# Patient Record
Sex: FEMALE | Race: ASIAN | NOT HISPANIC OR LATINO | Employment: UNEMPLOYED | URBAN - METROPOLITAN AREA
[De-identification: names, ages, dates, MRNs, and addresses within clinical notes are randomized per-mention and may not be internally consistent; named-entity substitution may affect disease eponyms.]

---

## 2024-01-16 ENCOUNTER — OFFICE VISIT (OUTPATIENT)
Dept: FAMILY MEDICINE | Facility: CLINIC | Age: 44
End: 2024-01-16
Payer: COMMERCIAL

## 2024-01-16 VITALS
SYSTOLIC BLOOD PRESSURE: 148 MMHG | DIASTOLIC BLOOD PRESSURE: 101 MMHG | HEART RATE: 111 BPM | WEIGHT: 148.4 LBS | OXYGEN SATURATION: 100 % | HEIGHT: 61 IN | TEMPERATURE: 97.7 F | BODY MASS INDEX: 28.02 KG/M2

## 2024-01-16 DIAGNOSIS — Z00.00 ROUTINE GENERAL MEDICAL EXAMINATION AT A HEALTH CARE FACILITY: Primary | ICD-10-CM

## 2024-01-16 DIAGNOSIS — F32.1 MAJOR DEPRESSIVE DISORDER, SINGLE EPISODE, MODERATE (H): ICD-10-CM

## 2024-01-16 DIAGNOSIS — N95.9 PREMENOPAUSAL PATIENT: ICD-10-CM

## 2024-01-16 DIAGNOSIS — Z11.59 NEED FOR HEPATITIS C SCREENING TEST: ICD-10-CM

## 2024-01-16 DIAGNOSIS — E04.9 THYROID GOITER: ICD-10-CM

## 2024-01-16 DIAGNOSIS — Z11.4 SCREENING FOR HIV (HUMAN IMMUNODEFICIENCY VIRUS): ICD-10-CM

## 2024-01-16 DIAGNOSIS — K59.00 CONSTIPATION, UNSPECIFIED CONSTIPATION TYPE: ICD-10-CM

## 2024-01-16 DIAGNOSIS — Z12.4 CERVICAL CANCER SCREENING: ICD-10-CM

## 2024-01-16 DIAGNOSIS — R03.0 ELEVATED BLOOD PRESSURE READING WITHOUT DIAGNOSIS OF HYPERTENSION: ICD-10-CM

## 2024-01-16 PROBLEM — M25.531 BILATERAL WRIST PAIN: Status: ACTIVE | Noted: 2021-08-15

## 2024-01-16 PROBLEM — M25.532 BILATERAL WRIST PAIN: Status: RESOLVED | Noted: 2021-08-15 | Resolved: 2024-01-16

## 2024-01-16 PROBLEM — E28.2 PCOS (POLYCYSTIC OVARIAN SYNDROME): Status: ACTIVE | Noted: 2018-03-27

## 2024-01-16 PROBLEM — R79.89 LOW VITAMIN B12 LEVEL: Status: ACTIVE | Noted: 2021-10-26

## 2024-01-16 PROBLEM — E78.5 HYPERLIPIDEMIA: Status: ACTIVE | Noted: 2017-01-01

## 2024-01-16 PROBLEM — M25.531 BILATERAL WRIST PAIN: Status: RESOLVED | Noted: 2021-08-15 | Resolved: 2024-01-16

## 2024-01-16 PROBLEM — D50.9 IRON DEFICIENCY ANEMIA: Status: ACTIVE | Noted: 2020-11-27

## 2024-01-16 PROBLEM — M25.532 BILATERAL WRIST PAIN: Status: ACTIVE | Noted: 2021-08-15

## 2024-01-16 LAB
ANION GAP SERPL CALCULATED.3IONS-SCNC: 8 MMOL/L (ref 7–15)
BUN SERPL-MCNC: 9 MG/DL (ref 6–20)
CALCIUM SERPL-MCNC: 9.2 MG/DL (ref 8.6–10)
CHLORIDE SERPL-SCNC: 105 MMOL/L (ref 98–107)
CHOLEST SERPL-MCNC: 293 MG/DL
CREAT SERPL-MCNC: 0.72 MG/DL (ref 0.51–0.95)
DEPRECATED HCO3 PLAS-SCNC: 24 MMOL/L (ref 22–29)
EGFRCR SERPLBLD CKD-EPI 2021: >90 ML/MIN/1.73M2
FASTING STATUS PATIENT QL REPORTED: NO
GLUCOSE SERPL-MCNC: 95 MG/DL (ref 70–99)
HCV AB SERPL QL IA: NONREACTIVE
HDLC SERPL-MCNC: 43 MG/DL
HIV 1+2 AB+HIV1 P24 AG SERPL QL IA: NONREACTIVE
LDLC SERPL CALC-MCNC: 181 MG/DL
NONHDLC SERPL-MCNC: 250 MG/DL
POTASSIUM SERPL-SCNC: 4 MMOL/L (ref 3.4–5.3)
SODIUM SERPL-SCNC: 137 MMOL/L (ref 135–145)
TRIGL SERPL-MCNC: 344 MG/DL
TSH SERPL DL<=0.005 MIU/L-ACNC: 1.61 UIU/ML (ref 0.3–4.2)

## 2024-01-16 PROCEDURE — 87624 HPV HI-RISK TYP POOLED RSLT: CPT

## 2024-01-16 PROCEDURE — 86803 HEPATITIS C AB TEST: CPT

## 2024-01-16 PROCEDURE — 99386 PREV VISIT NEW AGE 40-64: CPT | Mod: 25

## 2024-01-16 PROCEDURE — 36415 COLL VENOUS BLD VENIPUNCTURE: CPT

## 2024-01-16 PROCEDURE — 90480 ADMN SARSCOV2 VAC 1/ONLY CMP: CPT

## 2024-01-16 PROCEDURE — G0145 SCR C/V CYTO,THINLAYER,RESCR: HCPCS

## 2024-01-16 PROCEDURE — 80061 LIPID PANEL: CPT

## 2024-01-16 PROCEDURE — 91320 SARSCV2 VAC 30MCG TRS-SUC IM: CPT

## 2024-01-16 PROCEDURE — 87389 HIV-1 AG W/HIV-1&-2 AB AG IA: CPT

## 2024-01-16 PROCEDURE — 80048 BASIC METABOLIC PNL TOTAL CA: CPT

## 2024-01-16 PROCEDURE — 90471 IMMUNIZATION ADMIN: CPT

## 2024-01-16 PROCEDURE — 90686 IIV4 VACC NO PRSV 0.5 ML IM: CPT

## 2024-01-16 PROCEDURE — 99213 OFFICE O/P EST LOW 20 MIN: CPT | Mod: 25

## 2024-01-16 PROCEDURE — 84443 ASSAY THYROID STIM HORMONE: CPT

## 2024-01-16 RX ORDER — CETIRIZINE HYDROCHLORIDE 10 MG/1
10 TABLET ORAL
COMMUNITY

## 2024-01-16 RX ORDER — ACETAMINOPHEN AND CODEINE PHOSPHATE 120; 12 MG/5ML; MG/5ML
0.35 SOLUTION ORAL DAILY
Qty: 90 TABLET | Refills: 3 | Status: SHIPPED | OUTPATIENT
Start: 2024-01-16

## 2024-01-16 RX ORDER — ACETAMINOPHEN AND CODEINE PHOSPHATE 120; 12 MG/5ML; MG/5ML
0.35 SOLUTION ORAL
COMMUNITY
Start: 2023-03-12 | End: 2024-01-16

## 2024-01-16 RX ORDER — BUPROPION HYDROCHLORIDE 150 MG/1
TABLET ORAL
COMMUNITY
Start: 2022-06-02

## 2024-01-16 RX ORDER — POLYETHYLENE GLYCOL 3350 17 G/17G
17 POWDER, FOR SOLUTION ORAL
COMMUNITY

## 2024-01-16 RX ORDER — BUSPIRONE HYDROCHLORIDE 15 MG/1
15 TABLET ORAL
COMMUNITY
Start: 2022-08-20

## 2024-01-16 RX ORDER — PIMECROLIMUS 10 MG/G
CREAM TOPICAL
COMMUNITY

## 2024-01-16 RX ORDER — BUPROPION HYDROCHLORIDE 300 MG/1
TABLET ORAL
COMMUNITY
Start: 2022-05-08

## 2024-01-16 RX ORDER — KETOCONAZOLE 20 MG/ML
SHAMPOO TOPICAL
COMMUNITY
Start: 2023-06-09

## 2024-01-16 ASSESSMENT — ENCOUNTER SYMPTOMS
DIARRHEA: 0
JOINT SWELLING: 0
SHORTNESS OF BREATH: 0
HEARTBURN: 0
BREAST MASS: 0
WEAKNESS: 0
NAUSEA: 1
NERVOUS/ANXIOUS: 0
FREQUENCY: 0
DIZZINESS: 1
SORE THROAT: 0
EYE PAIN: 0
HEMATURIA: 0
MYALGIAS: 0
COUGH: 0
HEADACHES: 1
PARESTHESIAS: 0
ARTHRALGIAS: 0
CHILLS: 0
ABDOMINAL PAIN: 0
HEMATOCHEZIA: 0
DYSURIA: 0
FEVER: 0
PALPITATIONS: 0
CONSTIPATION: 1

## 2024-01-16 NOTE — PROGRESS NOTES
SUBJECTIVE:   Penny is a 43 year old, presenting for the following:  Physical and menopause (Symptoms of Menopause- feeling hot, nausea, bp/ discuss blood test)        2024     8:12 AM   Additional Questions   Roomed by maykel silverio       Healthy Habits:     Getting at least 3 servings of Calcium per day:  NO    Bi-annual eye exam:  Yes    Dental care twice a year:  Yes    Sleep apnea or symptoms of sleep apnea:  Daytime drowsiness    Diet:  Regular (no restrictions)    Frequency of exercise:  None    Taking medications regularly:  Yes    Medication side effects:  Other    Additional concerns today:  Yes    Multiple concerns today. Patient will make another appointment at a later time to discuss other concerns.     Today's PHQ-2 Score:       2024     7:56 AM   PHQ-2 (  [employer])   Q1: Little interest or pleasure in doing things 0   Q2: Feeling down, depressed or hopeless 0   PHQ-2 Score 0   Q1: Little interest or pleasure in doing things Not at all   Q2: Feeling down, depressed or hopeless Not at all   PHQ-2 Score 0     Sees a psychiatrist who prescribes medication and sees a therapist regularly.       Have you ever done Advance Care Planning? (For example, a Health Directive, POLST, or a discussion with a medical provider or your loved ones about your wishes): No, advance care planning information given to patient to review.  Patient plans to discuss their wishes with loved ones or provider.      Social History     Tobacco Use    Smoking status: Never    Smokeless tobacco: Never   Substance Use Topics    Alcohol use: Not on file           2024     7:56 AM   Alcohol Use   Prescreen: >3 drinks/day or >7 drinks/week? No     Reviewed orders with patient.  Reviewed health maintenance and updated orders accordingly - Yes  Lab work is in process    Breast Cancer Screenin/16/2024     7:57 AM   Breast CA Risk Assessment (FHS-7)   Do you have a family history of breast, colon, or ovarian cancer? No  / Unknown       Mammogram Screening - Offered annual screening and updated Health Maintenance for mutual plan based on risk factor consideration  Pertinent mammograms are reviewed under the imaging tab.    History of abnormal Pap smear: NO - age 30-65 PAP every 5 years with negative HPV co-testing recommended     Reviewed and updated as needed this visit by clinical staff   Tobacco  Allergies  Meds  Problems  Med Hx  Surg Hx  Fam Hx          Reviewed and updated as needed this visit by Provider   Tobacco  Allergies  Meds  Problems  Med Hx  Surg Hx  Fam Hx             Review of Systems   Constitutional:  Negative for chills and fever.   HENT:  Negative for congestion, ear pain, hearing loss and sore throat.    Eyes:  Negative for pain and visual disturbance.   Respiratory:  Negative for cough and shortness of breath.    Cardiovascular:  Negative for chest pain, palpitations and peripheral edema.   Gastrointestinal:  Positive for constipation and nausea. Negative for abdominal pain, diarrhea, heartburn and hematochezia.   Breasts:  Negative for tenderness, breast mass and discharge.   Genitourinary:  Positive for vaginal discharge. Negative for dysuria, frequency, genital sores, hematuria, pelvic pain, urgency and vaginal bleeding.   Musculoskeletal:  Negative for arthralgias, joint swelling and myalgias.   Skin:  Positive for rash.   Neurological:  Positive for dizziness and headaches. Negative for weakness and paresthesias.   Psychiatric/Behavioral:  Negative for mood changes. The patient is not nervous/anxious.      Has a history of constipation and hemorroids.     Experiencing menopausal symptoms. Currently on progesterone as birth control option so unable to determine last period. Has experienced mood swings and hot flashes. Unsure when her mother went through menopause but does not believe it was in her 40s.      OBJECTIVE:   BP (!) 148/101 (BP Location: Right arm, Patient Position: Sitting, Cuff  "Size: Adult Regular)   Pulse 111   Temp 97.7  F (36.5  C) (Oral)   Ht 1.549 m (5' 1\")   Wt 67.3 kg (148 lb 6.4 oz)   LMP  (LMP Unknown)   SpO2 100%   BMI 28.04 kg/m    Physical Exam  GENERAL: healthy, alert and no distress  EYES: Eyes grossly normal to inspection, PERRL and conjunctivae and sclerae normal  HENT: ear canals and TM's normal, nose and mouth without ulcers or lesions  NECK: no adenopathy, no asymmetry, masses, or scars and thyroid normal to palpation  RESP: lungs clear to auscultation - no rales, rhonchi or wheezes  BREAST: normal without masses, tenderness or nipple discharge and no palpable axillary masses or adenopathy  CV: regular rate and rhythm, normal S1 S2, no S3 or S4, no murmur, click or rub, no peripheral edema and peripheral pulses strong  ABDOMEN: soft, nontender, no hepatosplenomegaly, no masses and bowel sounds normal   (female): normal female external genitalia, normal urethral meatus, vaginal mucosa pink, moist and normal cervix without masses or discharge. PAP collected  MS: no gross musculoskeletal defects noted, no edema  SKIN: no suspicious lesions or rashes  NEURO: Normal strength and tone, mentation intact and speech normal  PSYCH: mentation appears normal, affect normal/bright        ASSESSMENT/PLAN:   (Z00.00) Routine general medical examination at a health care facility  (primary encounter diagnosis)  Comment: Care gaps addressed. Flu and COVID vaccines given. Routine labs ordered. Has not completed mammogram, order placed.   Plan: REVIEW OF HEALTH MAINTENANCE PROTOCOL ORDERS,         HIV Antigen Antibody Combo, Hepatitis C Screen         Reflex to HCV RNA Quant and Genotype, Pap         Screen with HPV - recommended age 30 - 65         years, INFLUENZA VACCINE IM > 6 MONTHS VALENT         IIV4 (AFLURIA/FLUZONE), COVID-19 12+ (2023-24)         ([employer]), Ob/Gyn  Referral,         norethindrone (MICRONOR) 0.35 MG tablet, Lipid         panel reflex to direct LDL " Fasting, TSH with         free T4 reflex, Basic metabolic panel  (Ca, Cl,        CO2, Creat, Gluc, K, Na, BUN), *MA Screening         Digital Bilateral        Follow up in one year for yearly prevent    (Z11.4) Screening for HIV (human immunodeficiency virus)  Comment: Lab ordered  Plan: HIV Antigen Antibody Combo    (Z11.59) Need for hepatitis C screening test  Comment: Lab ordered  Plan: Hepatitis C Screen Reflex to HCV RNA Quant and         Genotype    (R03.0) Elevated blood pressure reading without diagnosis of hypertension  Comment: Very anxious today. Encouraged patient to take blood pressures at home daily and record results. Plan for patient to follow up in 2-4 weeks to discuss blood pressure results.     (N95.9) Premenopausal patient  Comment: Currently on progesterone as birth control so difficult to determine menopausal state. Although young, patient is experiencing hot flashes and mood swings so she may be going through menopause. This is very bothersome to patient and is interested in various treatment options. Ob/Gyn referral placed.   Plan: Ob/Gyn  Referral    (Z12.4) Cervical cancer screening  Comment: PAP collected.  Plan: Pap Screen with HPV - recommended age 30 - 65         years    (F32.1) Major depressive disorder, single episode, moderate (H)  Comment: Stable, sees a psychiatrist for med management.     (K59.00) Constipation, unspecified constipation type  Comment: Has experienced constipation for a number of years along with hemorrhoids. States her diet is poor which she believes is contributing to constipation.   Plan: Take miralax daily and titrate based off of stools. Also increase fiber intake and/or implement fiber supplements into diet. Use perpetrationH for symptom management of hemorrhoids     (E04.9) Thyroid goiter  Comment: History of thyroid goiter. States she had treatment on it as a child and has her thyroid labs checked every couple of years for monitoring. TSH ordered.  "  Plan: Will follow up based on result.           COUNSELING:  Reviewed preventive health counseling, as reflected in patient instructions      BMI:   Estimated body mass index is 28.04 kg/m  as calculated from the following:    Height as of this encounter: 1.549 m (5' 1\").    Weight as of this encounter: 67.3 kg (148 lb 6.4 oz).         She reports that she has never smoked. She has never used smokeless tobacco.    LI Arevalo CNP  [facility]  "

## 2024-01-16 NOTE — PATIENT INSTRUCTIONS
Start Miralax daily and titrate based off of stools. Try to increase your fiber and add fiber supplements.  Use preparationH to help with pain  Make an appointment with OB/Gyn for post menopause    Preventive Health Recommendations  Female Ages 40 to 49    Yearly exam:   See your health care provider every year in order to  Review health changes.   Discuss preventive care.    Review your medicines if your doctor prescribed any.    Get a Pap test every three years (unless you have an abnormal result and your provider advises testing more often).    If you get Pap tests with HPV test, you only need to test every 5 years, unless you have an abnormal result. You do not need a Pap test if your uterus was removed (hysterectomy) and you have not had cancer.    You should be tested each year for STDs (sexually transmitted diseases), if you're at risk.   Ask your doctor if you should have a mammogram.    Have a colonoscopy (test for colon cancer) beginning at age 45.  Ask your provider about other options like a yearly FIT test or Cologuard test every 3 years (stool tests)      Have a cholesterol test every 5 years.     Have a diabetes test (fasting glucose) after age 45. If you are at risk for diabetes, you should have this test every 3 years.    Shots: Get a flu shot each year. Get a tetanus shot every 10 years.     Nutrition:   Eat at least 5 servings of fruits and vegetables each day.  Eat whole-grain bread, whole-wheat pasta and brown rice instead of white grains and rice.  Get adequate Calcium and Vitamin D.      Lifestyle  Exercise at least 150 minutes a week (an average of 30 minutes a day, 5 days a week). This will help you control your weight and prevent disease.  Limit alcohol to one drink per day.  No smoking.   Wear sunscreen to prevent skin cancer.  See your dentist every six months for an exam and cleaning.

## 2024-01-16 NOTE — COMMUNITY RESOURCES LIST (ENGLISH)
01/16/2024   [facility]  N/A  For questions about this resource list or additional care needs, please contact your primary care clinic or care manager.  Phone: [phone]   Email: N/A   Address: [facility] [place] [address]   Hours: N/A        Hotlines and Helplines       Hotline - Housing crisis  1  [facility] Housing Resource Line Distance: 7.47 miles      Phone/Virtual   [address] [place] [address]  Language: English  Hours: Mon - Sun Open 24 Hours   Phone: (695) 991-9293 Website: https://www.[facility]./2689/Basic-Needs     2  [facility] Distance: 12.82 miles      Phone/Virtual   [address] [place] [address]  Language: English  Hours: Mon - Sun Open 24 Hours   Phone: (875) 474-4437 Email: communications@[facility].org Website: https://[facility].org/oursaviourshousing/          Housing       Coordinated Entry access point  3  [facility]  Office - [facility] Distance: 1.08 miles      Phone/Virtual   [address] [place] [address]  Language: English  Hours: Mon - Fri 8:30 AM - 12:00 PM , Mon - Fri 1:00 PM - 4:00 PM  Fees: Free   Phone: (871) 570-5288 Ext.2 Email: tammie@[facility] [place].[facility].org Website: https://www.[facility] Networks[facility].org/usn/     4  [facility] ([facility] - Housing Services Distance: 12.94 miles      In-Person   [address] [place] [address]  Language: English  Hours: Mon - Fri 9:00 AM - 5:00 PM  Fees: Free   Phone: (217) 994-4034 Email: housing@[facility].org Website: http://www.[facility].org/housing     Drop-in center or day shelter  5  [facility] Distance: 11.32 miles      In-Person   [address] [place] [address]  Language: English, Hmong, Afghan, Cymro  Hours: Mon - Thu 8:30 AM - 4:30 PM , Sat - Sun 9:00 AM - 12:00 PM  Fees: Free   Phone: (712) 851-6858 Email: info@[employer].org Website: https://[employer].org/     6   [facility] Distance: 12.39 miles      In-Person   [address] [place] [address]  Language: English, Macanese, Welsh  Hours: Mon - Sat 7:00 AM - 3:00 PM  Fees: Free, Self Pay   Phone: (484) 193-8439 Email: info@[employer] Website: https://www.[employer]/locations/opportunity-center/     Housing search assistance  7  [facility] ([facility]) - [facility] Rental Housing Distance: 1.09 miles      In-Person, Phone/Virtual   [address] [place] [address]  Language: English  Hours: Mon - Fri 8:00 AM - 4:30 PM  Fees: Free   Phone: (856) 729-3326 Email: accap@[employer].org Website: http://www.[employer]ap.org     8  [facility] [facility] [employer] ([employer] Distance: 5.96 miles      Phone/Virtual   [address] [place] [address]  Language: English, Welsh  Hours: Mon - Fri 9:00 AM - 5:00 PM  Fees: Free   Phone: (676) 821-9356 Email: services@[employer] Website: https://www.[employer]     Shelter for families  9  [facility] Distance: 12.67 miles      In-Person   [address] [place] [address]  Language: English  Hours: Mon - Fri 3:00 PM - 9:00 AM , Sat - Sun Open 24 Hours  Fees: Free   Phone: (311) 287-1265 Ext.1 Website: https://www.[facility].org/2020/07/03/emergency-family-shelter/     Shelter for individuals  10  [facility] Distance: 11.65 miles      In-Person   [address] [place] [address]  Language: English  Hours: Mon - Fri 4:00 PM - 9:00 AM  Fees: Free   Phone: (463) 205-3100 Email: earl@[facility].org Website: https://[facility].org/[facility]/     11  [facility] Distance: 12.82 miles      In-Person   [address] [place] [address]  Language: English  Hours: Mon - Sun Open 24 Hours  Fees: Free   Phone: (344) 621-6764 Email:  communications@[facility].org Website: https://[facility].org/oursaviourshousing/          Important Numbers & Websites       Emergency Services   911  [facility]   311  Poison Control   (760) 435-9887  Suicide Prevention Lifeline   (555) 987-2453 (TALK)  Child Abuse Hotline   (120) 142-5776 (4-A-Child)  Sexual Assault Hotline   (123) 524-8947 (HOPE)  National Runaway Safeline   (478) 351-3587 (RUNAWAY)  All-Options Talkline   (931) 496-2360  Substance Abuse Referral   (570) 234-4891 (HELP)

## 2024-01-17 NOTE — RESULT ENCOUNTER NOTE
Kody Francis,     It was nice meeting you the other day!     Thyroid, Kidneys, electrolytes, and blood sugar are all normal.     Your cholesterol is elevated so I recommend working on diet and exercise changes to bring down these numbers. I added some information below. We will recheck this in one year.      Feel free to contact me via [employer] or call the clinic at [phone].     Sincerely,  Jennifer Huddleston DNP, FNP-C    The 10-year ASCVD risk score (Davin ROY, et al., 2019) is: 2.8%

## 2024-01-18 LAB
BKR LAB AP GYN ADEQUACY: NORMAL
BKR LAB AP GYN INTERPRETATION: NORMAL
BKR LAB AP HPV REFLEX: NORMAL
BKR LAB AP PREVIOUS ABNORMAL: NORMAL
PATH REPORT.COMMENTS IMP SPEC: NORMAL
PATH REPORT.COMMENTS IMP SPEC: NORMAL
PATH REPORT.RELEVANT HX SPEC: NORMAL

## 2024-01-22 LAB
HUMAN PAPILLOMA VIRUS 16 DNA: NEGATIVE
HUMAN PAPILLOMA VIRUS 18 DNA: NEGATIVE
HUMAN PAPILLOMA VIRUS FINAL DIAGNOSIS: NORMAL
HUMAN PAPILLOMA VIRUS OTHER HR: NEGATIVE

## 2024-01-25 ENCOUNTER — APPOINTMENT (OUTPATIENT)
Dept: URBAN - METROPOLITAN AREA CLINIC 252 | Age: 44
Setting detail: DERMATOLOGY
End: 2024-01-26

## 2024-01-25 VITALS — WEIGHT: 148 LBS | HEIGHT: 61 IN

## 2024-01-25 DIAGNOSIS — L20.89 OTHER ATOPIC DERMATITIS: ICD-10-CM

## 2024-01-25 DIAGNOSIS — L50.1 IDIOPATHIC URTICARIA: ICD-10-CM

## 2024-01-25 DIAGNOSIS — L21.8 OTHER SEBORRHEIC DERMATITIS: ICD-10-CM

## 2024-01-25 PROCEDURE — OTHER PRESCRIPTION: OTHER

## 2024-01-25 PROCEDURE — 99204 OFFICE O/P NEW MOD 45 MIN: CPT

## 2024-01-25 PROCEDURE — OTHER COUNSELING: OTHER

## 2024-01-25 RX ORDER — CLOBETASOL PROPIONATE 0.5 MG/G
0.05% CREAM TOPICAL BID
Qty: 60 | Refills: 2 | Status: ERX | COMMUNITY
Start: 2024-01-25

## 2024-01-25 RX ORDER — HYDROCORTISONE 25 MG/G
2.5% CREAM TOPICAL BID
Qty: 30 | Refills: 1 | Status: ERX | COMMUNITY
Start: 2024-01-25

## 2024-01-25 RX ORDER — TRIAMCINOLONE ACETONIDE 1 MG/G
0.1% CREAM TOPICAL BID
Qty: 80 | Refills: 1 | Status: ERX | COMMUNITY
Start: 2024-01-25

## 2024-01-25 ASSESSMENT — LOCATION SIMPLE DESCRIPTION DERM
LOCATION SIMPLE: TRAPEZIAL NECK
LOCATION SIMPLE: RIGHT CHEEK
LOCATION SIMPLE: RIGHT HAND
LOCATION SIMPLE: HAIR
LOCATION SIMPLE: POSTERIOR NECK
LOCATION SIMPLE: LEFT CHEEK
LOCATION SIMPLE: LEFT HAND
LOCATION SIMPLE: LEFT FOREHEAD

## 2024-01-25 ASSESSMENT — LOCATION DETAILED DESCRIPTION DERM
LOCATION DETAILED: RIGHT SUPERIOR CENTRAL MALAR CHEEK
LOCATION DETAILED: RIGHT MEDIAL TRAPEZIAL NECK
LOCATION DETAILED: HAIR
LOCATION DETAILED: LEFT CENTRAL MALAR CHEEK
LOCATION DETAILED: MID TRAPEZIAL NECK
LOCATION DETAILED: LEFT SUPERIOR CENTRAL MALAR CHEEK
LOCATION DETAILED: LEFT THENAR EMINENCE
LOCATION DETAILED: RIGHT THENAR EMINENCE
LOCATION DETAILED: LEFT INFERIOR MEDIAL FOREHEAD

## 2024-01-25 ASSESSMENT — LOCATION ZONE DERM
LOCATION ZONE: HAND
LOCATION ZONE: FACE
LOCATION ZONE: SCALP
LOCATION ZONE: NECK

## 2024-01-25 NOTE — HPI: RASH
Is This A New Presentation, Or A Follow-Up?: Rash
Additional History: She reports history of excema on hands. She reports random itchy episodes with no skin visual changes, tried ketoconazole shampoo for scalp and this helps flakiness if used a few days per week. She reports sweaty palms. She reports using fragranted moisturizers over whole body regularly. Now using laroch posay on body. Using okeef work hands on hands.  Was given steroid cream for “eye eczema” and this has been helpful.  She was recommended by allergist to have an allergen free diet and could not do longer than a week. Uses generic and woolight detergent and reports the woolight is not free and clear.  Does not use softener.  Uses dove sensitive skin. Takes cetirizine 10mg daily for years for environmental allergies. Started taking 3 times per day and currently taking bid generally and this helps her general itch.

## 2024-01-25 NOTE — PROCEDURE: COUNSELING
Patient Specific Counseling (Will Not Stick From Patient To Patient): -Recommend using Vanicream products (Moisturizer, shampoos, soap)
Detail Level: Zone
Patient Specific Counseling (Will Not Stick From Patient To Patient): -Recommend taking two tablets of OTC Zyrtec (cetirizine) at night, then once per night then use as needed.
Detail Level: Simple

## 2024-01-29 NOTE — PROGRESS NOTES
Assessment & Plan     Premenopausal patient  - Ob/Gyn  Referral    Symptomatic menopausal or female climacteric states   Patient and I had a very lengthy discussion related to her symptoms. Discussed use of hormonal contraceptive medication vs HRT. For HRT, discussed use for management of vasomotor and/or urogenital symptoms. I encourage her to continue to work with her psychiatrist for management of moods. We discussed the risks and benefits of hormone replacement therapy and the Women's Health Initiative. Including discussion of increased risk of stroke, heart attack, and coronary artery disease in patients with a personal history significant for chronic hypertension, hypercholesterolemia, or strong family history of coronary artery disease. We discussed current reccomendations for prescribing estrogen for symptomatic relief of hot flashes on a temporary basis. Discussed goal of being on hormonal replacement medication is for shortest duration needed and at lowest dose that can manage symptoms. We discussed a slightly increased risk of breast cancer.   Had PP HTN and one elevated reading at her preventive exam a few weeks ago. Normal but upper limit normal today. Her questions related to what she can to do help manage blood pressure discussed including diet changes, physical activity recommendations and other lifestyle modifications. Discussed how this can also help with her weight loss.  Patient still does need contraception and HRT will not provide this for her. Discussed use of Mirconor alone as she has been doing, but may not find much additional management of symptoms. Could consider progesterone IUD and transdermal estrogen HRT vs combined hormonal contraceptive with close monitoring of blood pressure. Will check labs below and plan follow up visit to discuss results/options in more detail. Patient is given an opportunity to ask questions and have them answered.  - Follicle stimulating hormone;  Future  - Estradiol; Future    42 minutes spent by me on the date of the encounter doing chart review, history and exam, documentation and further activities per the note       Subjective   Penny is a 43 year old, presenting for the following health issues:  Hot flashes/ Mood Swings    HPI     Hot flashes/ Mood Swings    Patient presents today to discuss concerns with vasomotor symptoms that have become disruptive to her quality of life. Patient has been on Micronor for about 16 months since her last delivery. She is no longer breastfeeding.  For the last 1 year or so, feels hot all the time-comes in waves with episodes of sudden sweating even just doing her normal tasks. Feels flushed, can get lightheaded when this occurs. Noting changes in body odor, weight gain-does admit to not too much effort with losing. ADHD symptoms seem to be exacerbated with the hot flashes. Moods have shifted and can be more volatile. Psychiatrist manages her ADHD and depression.   History of PCOS that was diagnosed with RE during work up for infertility. Denies urogenital symptoms. Nodes not waking up hot at night and having to remove covers, has not soaked pajamas or needed to change them at night.   With Micronor, has monthly menstrual bleeding, though they have become shorter-1.5 days of bleeding than then spotting. They have been more painful. Prior to pregnancy, cycles were irregular-longer between.  Patient with a history of postpartum HTN. Blood pressures have been labile since-some elevated, today upper normal. History of hyperlipidemia-though was not fasting at time of her appointment. Non smoker.     Component      Latest Ref Rng 1/16/2024  9:26 AM   Cholesterol      <200 mg/dL 293 (H)    Triglycerides      <150 mg/dL 344 (H)    HDL Cholesterol      >=50 mg/dL 43 (L)    LDL Cholesterol Calculated      <=100 mg/dL 181 (H)    Non HDL Cholesterol      <130 mg/dL 250 (H)    Patient Fasting? No    TSH      0.30 - 4.20 uIU/mL 1.61   "     Review of Systems  Constitutional, HEENT, cardiovascular, pulmonary, gi and gu systems are negative, except as otherwise noted.      Objective    /88 (BP Location: Right arm, Patient Position: Sitting, Cuff Size: Adult Regular)   Pulse 86   Ht 1.549 m (5' 1\")   Wt 66 kg (145 lb 6.4 oz)   LMP 01/20/2024 (Exact Date)   SpO2 97%   BMI 27.47 kg/m    Body mass index is 27.47 kg/m .  Physical Exam   GENERAL: alert and no distress  MS: no gross musculoskeletal defects noted, no edema  SKIN: no suspicious lesions or rashes  PSYCH: mentation appears normal, affect normal/bright    Signed Electronically by: LI Temple CNP    Answers submitted by the patient for this visit:  Patient Health Questionnaire (Submitted on 1/30/2024)  If you checked off any problems, how difficult have these problems made it for you to do your work, take care of things at home, or get along with other people?: Not difficult at all  PHQ9 TOTAL SCORE: 6    "

## 2024-01-30 ENCOUNTER — OFFICE VISIT (OUTPATIENT)
Dept: OBGYN | Facility: CLINIC | Age: 44
End: 2024-01-30
Attending: NURSE PRACTITIONER
Payer: COMMERCIAL

## 2024-01-30 VITALS
DIASTOLIC BLOOD PRESSURE: 88 MMHG | SYSTOLIC BLOOD PRESSURE: 137 MMHG | BODY MASS INDEX: 27.45 KG/M2 | HEIGHT: 61 IN | HEART RATE: 86 BPM | OXYGEN SATURATION: 97 % | WEIGHT: 145.4 LBS

## 2024-01-30 DIAGNOSIS — N95.9 PREMENOPAUSAL PATIENT: ICD-10-CM

## 2024-01-30 DIAGNOSIS — N95.1 SYMPTOMATIC MENOPAUSAL OR FEMALE CLIMACTERIC STATES: Primary | ICD-10-CM

## 2024-01-30 LAB
ESTRADIOL SERPL-MCNC: 71 PG/ML
FSH SERPL IRP2-ACNC: 6.3 MIU/ML

## 2024-01-30 PROCEDURE — 83001 ASSAY OF GONADOTROPIN (FSH): CPT | Performed by: NURSE PRACTITIONER

## 2024-01-30 PROCEDURE — 36415 COLL VENOUS BLD VENIPUNCTURE: CPT | Performed by: NURSE PRACTITIONER

## 2024-01-30 PROCEDURE — 99213 OFFICE O/P EST LOW 20 MIN: CPT | Performed by: NURSE PRACTITIONER

## 2024-01-30 PROCEDURE — 82670 ASSAY OF TOTAL ESTRADIOL: CPT | Performed by: NURSE PRACTITIONER

## 2024-01-30 RX ORDER — CLOBETASOL PROPIONATE 0.5 MG/G
CREAM TOPICAL
COMMUNITY
Start: 2024-01-25

## 2024-01-30 RX ORDER — HYDROCORTISONE 2.5 %
CREAM (GRAM) TOPICAL
COMMUNITY
Start: 2024-01-25

## 2024-01-30 RX ORDER — TRIAMCINOLONE ACETONIDE 1 MG/G
CREAM TOPICAL
COMMUNITY

## 2024-01-30 ASSESSMENT — PATIENT HEALTH QUESTIONNAIRE - PHQ9
10. IF YOU CHECKED OFF ANY PROBLEMS, HOW DIFFICULT HAVE THESE PROBLEMS MADE IT FOR YOU TO DO YOUR WORK, TAKE CARE OF THINGS AT HOME, OR GET ALONG WITH OTHER PEOPLE: NOT DIFFICULT AT ALL
SUM OF ALL RESPONSES TO PHQ QUESTIONS 1-9: 6
SUM OF ALL RESPONSES TO PHQ QUESTIONS 1-9: 6
10. IF YOU CHECKED OFF ANY PROBLEMS, HOW DIFFICULT HAVE THESE PROBLEMS MADE IT FOR YOU TO DO YOUR WORK, TAKE CARE OF THINGS AT HOME, OR GET ALONG WITH OTHER PEOPLE: NOT DIFFICULT AT ALL
SUM OF ALL RESPONSES TO PHQ QUESTIONS 1-9: 6
SUM OF ALL RESPONSES TO PHQ QUESTIONS 1-9: 6

## 2024-01-30 NOTE — PATIENT INSTRUCTIONS
If you have any questions regarding your visit, Please contact your care team.     [employer] [facility]: [phone]  To Schedule an Appointment 24/7  Call: [phone] TELEPHONE NUM[facility]     Verito Gonzales- APRN CNP      Jaxson Sanchez-Surgery Scheduler  Nisha-Surgery Scheduler         Monday 7:30am-2:00pm    Tuesday 7:30am-4:00pm    Wednesday 7:30am-2:00pm    Thursday 7:30am-11:00am    Friday 7:30am-2:00pm [address] [address]  Phone- [phone]   Fax- [phone]     Imaging Scheduling all locations  [phone]    [facility] Delivery  [address] [place] [address]   [place] [address] [phone]         Urgent Care locations:  [facility]   Monday-Friday  10 am - 8 pm  Saturday and Sunday   9 am - 5 pm     [phone]   If you need a medication refill, please contact your pharmacy. Please allow 3 business days for your refill to be completed.  As always, Thank you for trusting us with your healthcare needs!      see additional instructions from your care team below

## 2024-01-31 ENCOUNTER — VIRTUAL VISIT (OUTPATIENT)
Dept: FAMILY MEDICINE | Facility: CLINIC | Age: 44
End: 2024-01-31
Payer: COMMERCIAL

## 2024-01-31 DIAGNOSIS — Z01.30 BP CHECK: ICD-10-CM

## 2024-01-31 DIAGNOSIS — K59.00 CONSTIPATION, UNSPECIFIED CONSTIPATION TYPE: Primary | ICD-10-CM

## 2024-01-31 DIAGNOSIS — N39.3 FEMALE STRESS INCONTINENCE: ICD-10-CM

## 2024-01-31 PROCEDURE — 99213 OFFICE O/P EST LOW 20 MIN: CPT | Mod: 95

## 2024-01-31 NOTE — PROGRESS NOTES
"Penny is a 43 year old who is being evaluated via a billable video visit.      How would you like to obtain your AVS? MyChart  If the video visit is dropped, the invitation should be resent by: Text to cell phone: [phone]  Will anyone else be joining your video visit? No      Assessment & Plan     Constipation, unspecified constipation type  Stable, patient has incorporated fiber into her diet. Discussed that it may take a couple of weeks to see results. Plan for patient to follow up if does not improve or worsens.     Female stress incontinence  Previously did pelvic floor therapy after vaginal birth and found improvement in her stress incontinence. Continues to have incontinence with coughing and laughing and would like to complete pelvic floor therapy again. Referral placed.   - Physical Therapy Referral; Future    BP check  Patient has been unable to check BP at home. Blood pressure at Ob/Gyn appointment was high normal. Plan for patient to record blood pressure readings for the next week weeks and send a [email or web address] message with results. Patient is agreeable to the plan.             BMI  Estimated body mass index is 27.47 kg/m  as calculated from the following:    Height as of 1/30/24: 1.549 m (5' 1\").    Weight as of 1/30/24: 66 kg (145 lb 6.4 oz).             Subjective   Penny is a 43 year old, presenting for the following health issues:  Follow Up, Hypertension, and Referral (PT- for pelvic floor therapy)      1/31/2024     3:33 PM   Additional Questions   Roomed by maykel silverio     History of Present Illness       Hypertension: She presents for follow up of hypertension.  She does not check blood pressure  regularly outside of the clinic. Outpatient blood pressures have not been over 140/90. She does not follow a low salt diet.     She eats 2-3 servings of fruits and vegetables daily.She consumes 1 sweetened beverage(s) daily.She exercises with enough effort to increase her heart rate 9 or less minutes per " day.  She exercises with enough effort to increase her heart rate 3 or less days per week. She is missing 2 dose(s) of medications per week.  She is not taking prescribed medications regularly due to remembering to take.     Met with Ob/Gyn yesterday and it does not appear she is in menopause. She plans to meet with her gain next week to discuss further treatment for hot flashes.   Has urine leakage when laughing or coughing. She saw pelvic floor therapy after her last child and symptoms improved but patient would like to continue therapy.       Objective           Vitals:  No vitals were obtained today due to virtual visit.    Physical Exam   GENERAL: alert and no distress  EYES: Eyes grossly normal to inspection.  No discharge or erythema, or obvious scleral/conjunctival abnormalities.  RESP: No audible wheeze, cough, or visible cyanosis.    SKIN: Visible skin clear. No significant rash, abnormal pigmentation or lesions.  NEURO: Cranial nerves grossly intact.  Mentation and speech appropriate for age.  PSYCH: Appropriate affect, tone, and pace of words        Video-Visit Details    Type of service:  Video Visit     Originating Location (pt. Location): Home    Distant Location (provider location):  On-site  Platform used for Video Visit: Beau  Signed Electronically by: LI Arevalo CNP

## 2024-02-07 NOTE — PROGRESS NOTES
Penny is a 43 year old who is being evaluated via a billable telephone visit.      What phone number would you like to be contacted at? [phone]  How would you like to obtain your AVS? Angelika    Distant Location (provider location):  On-site    Assessment & Plan     Hot flashes  See below  - desogestrel-ethinyl estradiol (KARIVA) 0.15-0.02/0.01 MG (21/5) tablet; Take 1 tablet by mouth daily    Hormone disorder  Today, we reviewed her lab results that do not confirm menopause. Given this, patient does also need contraception at this time as pregnancy is not desired.  We discussed several options-can continue Micronor and symptoms may continue. Could consider Mirena IUD to provide contraception and progesterone support and add PO estradiol for vasomotor symptoms. Could consider combined hormonal contraception with close management of her blood pressure. We discussed benefits vs risks for all these methods.   For HRT, discussed the risks and benefits of hormone replacement therapy including increased risk of stroke, heart attack, and coronary artery disease in patients with a personal history significant for chronic hypertension, hypercholesterolemia, or strong family history of coronary artery disease. We discussed current reccomendations for prescribing estrogen for symptomatic relief of hot flashes on a temporary basis and that given she is not likely postmenopausal yet, would likely need hormones for some time. Discussed goal of being on hormonal replacement medication is for shortest duration needed and at lowest dose that can manage symptoms. We discussed a slightly increased risk of breast cancer. Discussed risks of HTN with combined contraception use. After her questions were answered, patient opting for trial of combined oral contraceptive pill. Will use pill below given the low dose and extra days of PO estrogen during menses time to help manage symptoms. Patient will monitor blood pressure 1-2x/week at  home and we discussed parameters for which she needs follow up. Discussed it will take some time for symptoms to improve, and realistic expectation that symptoms will still likely occur, but with goal of them being more manageable. Discussed when to switch to this the pill, taking it at the same time every day, possible side effects she may experience. Plan follow up appointment in 2 months, sooner if needed. Patient is given an opportunity to ask questions and have them answered.  - desogestrel-ethinyl estradiol (KARIVA) 0.15-0.02/0.01 MG (21/5) tablet; Take 1 tablet by mouth daily    Vidhya Francis is a 43 year old, presenting for the following health issues:  Follow Up (Symptomatic menopausal or female climacteric states)    Telephone-Visit Details    Type of service:  Telephone Visit    Phone call Start Time: 8:05AM    Phone call End Time: 8:26AM    Originating Location (pt. Location): Home - [place]    Distant Location (provider location):  [facility]       HPI     Follow up- Symptomatic menopausal or female climacteric states     Patient is following up from her previous visit regarding hormonal symptoms. She is postpartum about 16 months-has been taking Micronor for contraception. Is noting vasomotor symptoms that have become disruptive to her day to day life. She is no longer breastfeeding and for the last 1 year or so notes feelings of heat that comes in waves. With feeling flushed, notes lightheadedness. Moods have shifted quite a bit as well and she works with her psychiatrist to mange her ADHD and depression. Patient has a history of PCOS that was diagnosed by RE during infertility management. Does note night sweats as well.  On Micronor, monthly cycles are light and short.   Patient has a history of postpartum HTN and some labile blood pressures. Does have a blood pressure cuff at home so is able to monitor. Does have elevated lipids, but PCP not recommending treatment at this time.    She is unsure is she may be menopausal, so testing was done at her last visit and today she is following up to discuss options for management. Normal TSH by primary care last month.    Component      Latest Ref Rng 1/16/2024  9:26 AM 1/30/2024  9:12 AM   TSH      0.30 - 4.20 uIU/mL 1.61     FSH      mIU/mL  6.3    Estradiol      pg/mL  71        Review of Systems  Constitutional, HEENT, cardiovascular, pulmonary, gi and gu systems are negative, except as otherwise noted.      Objective         Vitals:  No vitals were obtained today due to virtual visit.    Physical Exam   General: Alert and no distress //Respiratory: No audible wheeze, cough, or shortness of breath // Psychiatric:  Appropriate affect, tone, and pace of words    Phone call duration: 21 minutes    Signed Electronically by: LI Temple CNP

## 2024-02-08 ENCOUNTER — VIRTUAL VISIT (OUTPATIENT)
Dept: OBGYN | Facility: CLINIC | Age: 44
End: 2024-02-08
Payer: COMMERCIAL

## 2024-02-08 DIAGNOSIS — E34.9 HORMONE DISORDER: ICD-10-CM

## 2024-02-08 DIAGNOSIS — R23.2 HOT FLASHES: Primary | ICD-10-CM

## 2024-02-08 PROCEDURE — 99443 PR PHYSICIAN TELEPHONE EVALUATION 21-30 MIN: CPT | Performed by: NURSE PRACTITIONER

## 2024-02-08 RX ORDER — DESOGESTREL AND ETHINYL ESTRADIOL 21-5 (28)
1 KIT ORAL DAILY
Qty: 84 TABLET | Refills: 1 | Status: SHIPPED | OUTPATIENT
Start: 2024-02-08

## 2024-02-08 NOTE — PATIENT INSTRUCTIONS
If you have any questions regarding your visit, Please contact your care team.     [employer]: [phone]  To Schedule an Appointment 24/7  Call: [phone] HOU[facility] TELEPHONE NUMBER     Verito Gonzales- APRN CNP      Jaxson Sanchez-Surgery Scheduler  Nisha-Surgery Scheduler         Monday 7:30am-2:00pm    Tuesday 7:30am-4:00pm    Wednesday 7:30am-2:00pm    Thursday 7:30am-11:00am    Friday 7:30am-2:00pm [address] [address]  Phone- [phone]   Fax- [phone]     Imaging Scheduling all locations  [phone]    [facility] Delivery  [address] [place] [address]   [place] [address] [phone]         Urgent Care locations:  [facility]   Monday-Friday  10 am - 8 pm  Saturday and Sunday   9 am - 5 pm     [phone]   If you need a medication refill, please contact your pharmacy. Please allow 3 business days for your refill to be completed.  As always, Thank you for trusting us with your healthcare needs!      see additional instructions from your care team below

## 2024-03-10 ENCOUNTER — HEALTH MAINTENANCE LETTER (OUTPATIENT)
Age: 44
End: 2024-03-10

## 2024-04-05 ENCOUNTER — THERAPY VISIT (OUTPATIENT)
Dept: PHYSICAL THERAPY | Facility: CLINIC | Age: 44
End: 2024-04-05
Payer: COMMERCIAL

## 2024-04-05 DIAGNOSIS — N39.3 FEMALE STRESS INCONTINENCE: ICD-10-CM

## 2024-04-05 PROCEDURE — 97110 THERAPEUTIC EXERCISES: CPT | Mod: GP | Performed by: PHYSICAL THERAPIST

## 2024-04-05 PROCEDURE — 97530 THERAPEUTIC ACTIVITIES: CPT | Mod: GP | Performed by: PHYSICAL THERAPIST

## 2024-04-05 PROCEDURE — 97140 MANUAL THERAPY 1/> REGIONS: CPT | Mod: GP | Performed by: PHYSICAL THERAPIST

## 2024-04-05 PROCEDURE — 97161 PT EVAL LOW COMPLEX 20 MIN: CPT | Mod: GP | Performed by: PHYSICAL THERAPIST

## 2024-04-05 NOTE — PROGRESS NOTES
PHYSICAL THERAPY EVALUATION  Type of Visit: Evaluation    See electronic medical record for Abuse and Falls Screening details.    Subjective       Presenting condition or subjective complaint:    Date of onset: 01/31/24        Prior therapy history for the same diagnosis, illness or injury:    pt reports previous PT for pelvic floor.      Pain assessment:  pt reports significant pain with hemorrhoids which is better recently. Pt reports significant back pain with pregnancy and overall recurrent constipation for many years.  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     Objective      PELVIC EVALUATION  ADDITIONAL HISTORY:  Sex assigned at birth: Female  Gender identity: Female    Pronouns: She/Her Hers      Bladder History:  Feels bladder filling: Yes  Triggers for feeling of inability to wait to go to the bathroom: Yes water running  How long can you wait to urinate: 10 min  Gets up at night to urinate: No    Can stop the flow of urine when urinating: Yes  Volume of urine usually released: Medium   Other issues: Dribbling after urinating  Number of bladder infections in last 12 months:    Fluid intake per day: 20oz      Medications taken for bladder: No     Activities causing urine leak: Cough; Sneeze; Jump; Hurrying to the bathroom due to a strong urge to urinate (pee)    Amount of urine typically leaked: small  Pads used to help with leaking: Yes I use this many pads per day: sometimes      Bowel History:  Frequency of bowel movement: 1x per day  Consistency of stool: Hard    Ignores the urge to defecate: No  Other bowel issues: Straining to have bowel movement; Pain when pooping  Length of time spent trying to have a bowel movement:      Sexual Function History:  Sexual orientation: Straight    Sexually active: No  Lubrication used: No No  Pelvic pain: Deep penetration (rectal or vaginal)    Pain or difficulty with orgasms/erection/ejaculation: No    State of menopause: Perimenopause (have not gone through menopause  yet)  Hormone medications: Yes Kariva tabs    Are you currently pregnant: No, Number of previous pregnancies: 2, Number of deliveries: 2, If you have delivered before, did you have any of these issues during delivery: Tearing; Vaginal delivery, Have you been diagnosed with pelvic prolapse or abdominal separation: No, Do you get regular exercise: No, Have you tried pelvic floor strengthening exercises for 4 weeks: No, Do you have any history of trauma that is relevant to your care that you d like to share: No    Discussed reason for referral regarding pelvic health needs and external/internal pelvic floor muscle examination with patient/guardian.  Opportunity provided to ask questions and verbal consent for assessment and intervention was given.      POSTURE: WNL  LUMBAR SCREEN: AROM WNL  HIP SCREEN:  Functional Strength Testing: Double Leg Squat: Anterior knee translation and Improper use of glutes/hips  Normal Hip ROM.     PELVIC EXAM  External Visual Inspection:  Observed hemorrhoid, roughly 1 cm in diameter.  Observed visible contraction of kegel with no visible lift, good relaxation afterwards.      Internal Digital Palpation:  Per Vagina:  Tenderness  Digital Muscle Performance: P (Power): 2  E (Endurance): 6s  F (Fast Twitch): 7/10 repetitions  Compensations: Adductors  Relaxation Post-Contraction: Partial/delayed relaxation  Tenderness with obturator internus    ABDOMINAL ASSESSMENT  Diastasis Rectus Abdominis (AMNA):  AMNA presence: Yes, at level of umbilicus 2cm      Assessment & Plan   CLINICAL IMPRESSIONS  Medical Diagnosis: Stress incontinence    Treatment Diagnosis: Stress incontinence   Impression/Assessment: Patient is a 43 year old female with Stress Incontinence complaints.  The following significant findings have been identified: Pain, Decreased ROM/flexibility, Decreased strength, Impaired muscle performance, and Decreased activity tolerance. These impairments interfere with their ability to perform  self care tasks, recreational activities, household chores, household mobility, and community mobility as compared to previous level of function.     Clinical Decision Making (Complexity):  Clinical Presentation: Stable/Uncomplicated  Clinical Presentation Rationale: based on medical and personal factors listed in PT evaluation  Clinical Decision Making (Complexity): Low complexity    PLAN OF CARE  Treatment Interventions:  Interventions: Manual Therapy, Neuromuscular Re-education, Therapeutic Activity    Long Term Goals     PT Goal 1  Goal Identifier: Stress Incontinence  Goal Description: pt will be able to laugh, cough, sneeze with no urinary leakage  Rationale: to maximize safety and independence with performance of ADLs and functional tasks  Target Date: 05/31/24      Frequency of Treatment: 1x every other week  Duration of Treatment: 8 weeks    Recommended Referrals to Other Professionals:   Education Assessment:   Learner/Method: Patient;Pictures/Video;Demonstration;No Barriers to Learning    Risks and benefits of evaluation/treatment have been explained.   Patient/Family/caregiver agrees with Plan of Care.     Evaluation Time:     PT Eval, Low Complexity Minutes (42623): 25       Signing Clinician: Becca Melendrez PT

## 2024-04-12 ENCOUNTER — THERAPY VISIT (OUTPATIENT)
Dept: PHYSICAL THERAPY | Facility: CLINIC | Age: 44
End: 2024-04-12
Payer: COMMERCIAL

## 2024-04-12 DIAGNOSIS — N39.3 FEMALE STRESS INCONTINENCE: Primary | ICD-10-CM

## 2024-04-12 PROCEDURE — 97110 THERAPEUTIC EXERCISES: CPT | Mod: GP | Performed by: PHYSICAL THERAPIST

## 2024-06-02 DIAGNOSIS — E34.9 HORMONE DISORDER: ICD-10-CM

## 2024-06-02 DIAGNOSIS — R23.2 HOT FLASHES: ICD-10-CM

## 2024-06-02 RX ORDER — DESOGESTREL AND ETHINYL ESTRADIOL 21-5 (28)
1 KIT ORAL DAILY
Qty: 84 TABLET | Refills: 1 | Status: CANCELLED | OUTPATIENT
Start: 2024-06-02

## 2024-06-03 NOTE — TELEPHONE ENCOUNTER
Requested Prescriptions   Pending Prescriptions Disp Refills    desogestrel-ethinyl estradiol (KARIVA) 0.15-0.02/0.01 MG (21/5) tablet 84 tablet 0     Sig: Take 1 tablet by mouth daily       Contraceptives Protocol Failed - 6/2/2024 11:50 AM        Failed - Medication indicated for associated diagnosis     Medication is associated with one or more of the following diagnoses:  Contraception  Acne  Dysmenorrhea  Menorrhagia  Amenorrhea  PCOS  Premenstrual Dysphoric Disorder          Passed - Patient is not a current smoker if age is 35 or older        Passed - Recent (12 mo) or future (30 days) visit within the authorizing provider's specialty     The patient must have completed an in-person or virtual visit within the past 12 months or has a future visit scheduled within the next 90 days with the authorizing provider s specialty.  Urgent care and e-visits do not quality as an office visit for this protocol.          Passed - Medication is active on med list        Passed - No active pregnancy on record        Passed - No positive pregnancy test in past 12 months           Pt last seen 2/8/2024 for hot flashes    Last prescribed 2/8/2024 for 84 tablets with 1 refill    Refills remain at patient's pharmacy. Refill not appropriate at this time, because there are additional refills remaining on current prescription      Renetta Atwood RN on 6/3/2024 at 9:30 AM

## 2025-02-06 ENCOUNTER — LAB (OUTPATIENT)
Dept: LAB | Facility: CLINIC | Age: 45
End: 2025-02-06
Payer: COMMERCIAL

## 2025-02-06 DIAGNOSIS — E04.9 THYROID GOITER: ICD-10-CM

## 2025-02-06 DIAGNOSIS — E78.5 HYPERLIPIDEMIA: ICD-10-CM

## 2025-02-06 DIAGNOSIS — E04.9 THYROID GOITER: Primary | ICD-10-CM

## 2025-02-06 LAB
CHOLEST SERPL-MCNC: 267 MG/DL
FASTING STATUS PATIENT QL REPORTED: YES
HDLC SERPL-MCNC: 53 MG/DL
LDLC SERPL CALC-MCNC: 158 MG/DL
NONHDLC SERPL-MCNC: 214 MG/DL
TRIGL SERPL-MCNC: 282 MG/DL
TSH SERPL DL<=0.005 MIU/L-ACNC: 2.24 UIU/ML (ref 0.3–4.2)

## 2025-02-07 PROBLEM — E55.9 VITAMIN D DEFICIENCY: Status: ACTIVE | Noted: 2017-01-01

## 2025-02-07 PROBLEM — Z86.59 HISTORY OF MAJOR DEPRESSION: Status: ACTIVE | Noted: 2020-11-28

## 2025-02-07 PROBLEM — F41.9 ANXIETY: Status: ACTIVE | Noted: 2021-09-02

## 2025-02-07 PROBLEM — H33.322 RETINAL HOLE, LEFT: Status: ACTIVE | Noted: 2019-03-04

## 2025-02-07 ASSESSMENT — PATIENT HEALTH QUESTIONNAIRE - PHQ9
SUM OF ALL RESPONSES TO PHQ QUESTIONS 1-9: 7
SUM OF ALL RESPONSES TO PHQ QUESTIONS 1-9: 7
10. IF YOU CHECKED OFF ANY PROBLEMS, HOW DIFFICULT HAVE THESE PROBLEMS MADE IT FOR YOU TO DO YOUR WORK, TAKE CARE OF THINGS AT HOME, OR GET ALONG WITH OTHER PEOPLE: SOMEWHAT DIFFICULT

## 2025-02-07 NOTE — PROGRESS NOTES
Assessment & Plan     Hot flashes  Symptoms were well managed while on medication, recurred after discontinuing. Will restart at this time-may take several cycles for symptoms to improve. Again reviewed potential side effects and risks of combined hormonal contraception including but not limited to thromboembolic events, hypertension, breakthrough bleeding, GI upset, and headaches. Reviewed proper usage. Reviewed use of back up contraception for the 1st month.     - desogestrel-ethinyl estradiol (KARIVA) 0.15-0.02/0.01 MG (21/5) tablet; Take 1 tablet by mouth daily.    Hormone disorder  Per above  - desogestrel-ethinyl estradiol (KARIVA) 0.15-0.02/0.01 MG (21/5) tablet; Take 1 tablet by mouth daily.      Vidhya Francis is a 44 year old, presenting for the following health issues:  Recheck Medication (Kariva)    HPI       Medication Followup of Kariva  Taking Medication as prescribed: NO- Ran out of refills  Side Effects:  None  Medication Helping Symptoms:  yes    Patient seen about a year ago for management of menopausal symptoms. At the time, labs did not indicate menopause and patient did need contraception as well, so started on low dose oral contraceptive pill for management of symptoms. While on it, noted improvement in hot flashes, moods. Felt like symptoms were well managed, so after prescription ran out in 6 months, discontinued. For the first 2 months off it, symptoms continued to be managed, but have gradually worsened. Recurrence of significant hot flashes, unstable moods-especially fluctuating prior to menses, also noting worsening pain with cycles. Has not tracked, but thinks she is cycling maybe not monthly, but not skipping multiple months. Feels it would be beneficial to restart medication.  Preventive exam last week and labs updated. Lipids showed some improvement in her panel compared to last year.  No contraindications to continue using medication at this time.    Review of  "Systems  Constitutional, HEENT, cardiovascular, pulmonary, gi and gu systems are negative, except as otherwise noted.      Objective    /87 (BP Location: Right arm, Patient Position: Sitting, Cuff Size: Adult Regular)   Pulse 102   Ht 1.537 m (5' 0.5\")   Wt 62.8 kg (138 lb 6.4 oz)   LMP 01/25/2025 (Within Days)   SpO2 94%   BMI 26.58 kg/m    Body mass index is 26.58 kg/m .   Physical Exam   GENERAL: alert and no distress  MS: no gross musculoskeletal defects noted, no edema  SKIN: no suspicious lesions or rashes  PSYCH: mentation appears normal, affect normal/bright    Signed Electronically by: LI Temple CNP    Answers submitted by the patient for this visit:  Patient Health Questionnaire (Submitted on 2/7/2025)  If you checked off any problems, how difficult have these problems made it for you to do your work, take care of things at home, or get along with other people?: Somewhat difficult  PHQ9 TOTAL SCORE: 7    "

## 2025-02-10 ENCOUNTER — OFFICE VISIT (OUTPATIENT)
Dept: OBGYN | Facility: CLINIC | Age: 45
End: 2025-02-10
Payer: COMMERCIAL

## 2025-02-10 VITALS
OXYGEN SATURATION: 94 % | BODY MASS INDEX: 26.13 KG/M2 | DIASTOLIC BLOOD PRESSURE: 87 MMHG | HEIGHT: 61 IN | WEIGHT: 138.4 LBS | HEART RATE: 102 BPM | SYSTOLIC BLOOD PRESSURE: 137 MMHG

## 2025-02-10 DIAGNOSIS — R23.2 HOT FLASHES: ICD-10-CM

## 2025-02-10 DIAGNOSIS — E34.9 HORMONE DISORDER: ICD-10-CM

## 2025-02-10 PROCEDURE — 99214 OFFICE O/P EST MOD 30 MIN: CPT | Performed by: NURSE PRACTITIONER

## 2025-02-10 RX ORDER — DESOGESTREL AND ETHINYL ESTRADIOL 21-5 (28)
1 KIT ORAL DAILY
Qty: 84 TABLET | Refills: 3 | Status: SHIPPED | OUTPATIENT
Start: 2025-02-10

## 2025-02-10 NOTE — PATIENT INSTRUCTIONS
If you have any questions regarding your visit, Please contact your care team.     [employer]: [phone]  To Schedule an Appointment 24/7  Call: [phone] HOURS TELEPHONE NUMBER     Verito Gonzales- APRN CNP      Jaxson Sanchez-Surgery Scheduler  Nisha-Surgery Scheduler         Monday 7:30am-2:00pm    Tuesday 7:30am-4:00pm    Wednesday 7:30am-2:00pm    Thursday 7:30am-11:00am    Friday 7:30am-2:00pm [address] [address]  Phone- [phone]   Fax- [phone]     Imaging Scheduling all locations  [phone]    [facility] Labor and Delivery  [address] [place] [address]   [place] [address] [phone]         Urgent Care locations:  [facility]   Monday-Friday  10 am - 8 pm  Saturday and Sunday   9 am - 5 pm     [phone]   If you need a medication refill, please contact your pharmacy. Please allow 3 business days for your refill to be completed.  As always, Thank you for trusting us with your healthcare needs!      see additional instructions from your care team below

## 2025-02-19 NOTE — TELEPHONE ENCOUNTER
RECORDS RECEIVED FROM: internal   REASON FOR VISIT: Tremor    PROVIDER: Dr. Green   DATE OF APPT: 3/28/25   NOTES (FOR ALL VISITS) STATUS DETAILS   OFFICE NOTE from referring provider Internal Jennifer Huddleston @ [facility]:  2/7/25   [facility] Everywhere [facility]:  9/30/21   MEDICATION LIST Internal    IMAGING  (FOR ALL VISITS)     MRI (HEAD, NECK, SPINE) N/A    CT (HEAD, NECK, SPINE) N/A

## 2025-03-24 NOTE — PROGRESS NOTES
"Department of Neurology  Movement Disorders Division  New Patient     Patient: Penny Vincent  MRN: [id number]   : 1980   Date of Visit: 2025  PCP: Jennifer Huddleston  Referring Provider:    CC: Tremor    History of Present Illness  Penny Vincent is a 44 year old R-handed female with pertinent PMHx of L retinal hole, HLD, PCOS, Vit D deficiency, iron deficiency anemia, Vit B12 deficiency, ADHD, anxiety, and depression that presents to Neurology Movement clinic with complaints of tremor.    The patient reports that she has had tremor in her hands for ~4-5 years, starting in her R hand. She cannot recall an inciting incident for this - especially as onset was insidious - save for pregnancy/birth around that time c/b pre-eclampsia and she also had some of her mood medications increased due to anxiety (started Buspar, was on Wellbutrin and Vortioxetine at the same/lower doses than present; was started on Adderall ~1yr ago for new diagnosis of ADHD). She recalls being \"shaky\" and imbalanced around that time. She mentioned this to her PCP who felt she likely had Essential Tremor, but it was not functionally limiting at the time and so nothing further was done. However, she feels that the strength in her hands has progressively worsened and also developed a bilateral tendinitis. She was seen by PT who again noted a tremor. Her tremor then became more severe ~2 years ago when she began intermittently drooping things and her tremor was becoming more obvious to others. More recently she has also developed a sense of internal tremor. This last was questioned by her mental health providers as being related to her mood medications and so they have since reduced her Wellbutrin 450 -300mg daily and also started her on Propranolol 10mg BID around 3/15/2025 (~2wks ago). This has since helped with the internal tremor and a bit in the R hand. She stopped this 1 wk ago in preparation for this appointment.     She reports that her " current tremor is present both at rest and with action. They are intermittent currently. It is aggravated by anxiety. She does not drink EtOH so it is unclear what effect this may have. The tremor is now symmetric in the bilateral hands but she has not noticed tremor elsewhere. She has not noticed any cramping/pulling sensation in her hands. She feels her legs will sometimes move in her sleep. She has difficulty with intermittently dropping things, feeling her hands are weak, handwriting has deteriorated, spills with drinking, difficulty with makeup, and some decline in other fine motor skills. She feels her balance has gotten worse over the past few years, but she is not falling. She denies numbness/tingling in her feet. This winter, she feels her fingers get colder than normal and can sometimes seem to cramp with the cold    She feels her sense of smell has worsened over time. She endorses a long history of constipation (has been attributed to anti-depressant use in the past but persisted despite going off these medications). She has had small movements in her feet during sleep and rarely kicked out but no other concern for dream enactment    She endorses episodic vertigo over the years. This has occurred in very small, second-long bursts, as well as episodes that will linger for a few days. She recalls a few instances of tinnitus with these episodes, but not every time. She and her father have a history of migraines as well as vertigo, but otherwise there is no neurologic FHx    She does endorse a FHx of tremor in her maternal grandmother (unclear onset; present in hands, face, and head)    In 2018, she had exacerbation of baseline intermittent tremor with the addition of Brexpiprazole and then resolved once the medication was tapered    Movement Disorder-related Medications                   AM PM      Adderall XR   (20mg) 3       Bupropion XL   (150mg) OFF       Bupropion XL   (300mg) 1       Buspirone   (15mg)  1       Vortioxetine   (20mg) 1       Propanolol  (10 mg) 1* 1*        *Held for a week prior to today's appointment.    Review of Systems:  Other than that mentioned above, the remainder of 12 systems reviewed were negative.    Past Medical History:   Past Medical History:   Diagnosis Date    ADHD (attention deficit hyperactivity disorder)     Depression     Gastroesophageal reflux disease without esophagitis     Hyperlipidemia LDL goal <130     Thyroid dysfunction        Past Surgical History:   Past Surgical History:   Procedure Laterality Date    PROCEDURE PLACEHOLDER ORAL      [place]eth       Family History:   Family History   Problem Relation Age of Onset    Hyperlipidemia Mother     Depression Mother     Coronary Artery Disease Father     Hypertension Father     Cerebrovascular Disease Paternal Grandmother     Cerebrovascular Disease Paternal Grandfather        Social History:   Social History     Tobacco Use    Smoking status: Never    Smokeless tobacco: Never   Vaping Use    Vaping status: Never Used   Substance Use Topics    Alcohol use: Not Currently    Drug use: Never       Medications:    Current Outpatient Medications:     amphetamine-dextroamphetamine (ADDERALL XR) 20 MG 24 hr capsule, Take 60 mg by mouth every morning., Disp: , Rfl:     buPROPion (WELLBUTRIN XL) 300 MG 24 hr tablet, , Disp: , Rfl:     busPIRone (BUSPAR) 15 MG tablet, Take 15 mg by mouth, Disp: , Rfl:     cetirizine (ZYRTEC) 10 MG tablet, Take 10 mg by mouth, Disp: , Rfl:     clobetasol propionate (TEMOVATE) 0.05 % external cream, Apply topically 2 times daily., Disp: 60 g, Rfl: 1    desogestrel-ethinyl estradiol (KARIVA) 0.15-0.02/0.01 MG (21/5) tablet, Take 1 tablet by mouth daily., Disp: 84 tablet, Rfl: 3    hydrocortisone 2.5 % cream, PLEASE SEE ATTACHED FOR DETAILED DIRECTIONS, Disp: , Rfl:     ketoconazole (NIZORAL) 2 % external shampoo, Apply topically daily as needed for itching or irritation., Disp: 120 mL, Rfl: 1     pimecrolimus (ELIDEL) 1 % external cream, , Disp: , Rfl:     polyethylene glycol (MIRALAX) 17 GM/Dose powder, Take 17 g by mouth, Disp: , Rfl:     propranolol (INDERAL) 10 MG tablet, Take 10 mg by mouth 2 times daily., Disp: , Rfl:     triamcinolone (KENALOG) 0.1 % external cream, PLEASE SEE ATTACHED FOR DETAILED DIRECTIONS, Disp: , Rfl:     vortioxetine (TRINTELLIX) 20 MG tablet, Take 20 mg by mouth, Disp: , Rfl:     buPROPion (WELLBUTRIN XL) 150 MG 24 hr tablet, TAKE ONE (1) TABLET BY MOUTH DAILY, TAKE IN ADDITION  MG DAILY FOR TOTAL DAILY DOSE  MG (Patient not taking: Reported on 3/28/2025), Disp: , Rfl:     norethindrone (MICRONOR) 0.35 MG tablet, Take 1 tablet (0.35 mg) by mouth daily (Patient not taking: Reported on 2/7/2025), Disp: 90 tablet, Rfl: 3    UNKNOWN TO PATIENT, Take 30 mg by mouth. Adderall- amphetaminesalts ER 24 hrs 30mg caps, Disp: , Rfl:      Allergies:   No Known Allergies     Physical Exam:  BP (!) 136/96 (BP Location: Right arm, Patient Position: Sitting, Cuff Size: Adult Regular)   Pulse 115   Resp 16   LMP 01/25/2025 (Within Days)   SpO2 95%     General: Sitting comfortably in chair, NAD  Neuro:  Mental Status: Awake, alert, attentive, and oriented. Able to provide a detailed history and answer questions without issue. Speech is clear and fluid  Cranial Nerves: Conjugate gaze, EOMI w/o nystagmus or square wave jerks, no facial asymmetry noted, hearing intact to conversation, no dysarthria, symmetric palate rise w/ midline uvula, tongue is midline, shoulder shrug 5/5 bilaterally  Motor: Normal bulk and tone. Strength is 5/5 in all extremities in both proximal and distal muscle groups. See motor scales below  Sensory: Intact to light touch in all extremities. Intact to vibration in the toes. Neg Romberg  Coordination: FNF and HS intact bilaterally  Reflexes: 2+, symmetric, and brisk in the bilateral biceps, brachioradialis, knees, and ankles. Down-going toes bilaterally, no  clonus  Gait: Stance is narrow. Gait is smooth and balanced. No issue with turns or tandem gait    With pouring water, tremor was present L>R. Pour was careful but no spills  Tremor overall if very distal and fine with high frequency/low amplitude      3/28/2025     2:00 PM   UPDRS Motor Scale   Time: 14:30   Medication Off   R Brain DBS: None   L Brain DBS: None   Dyskinesia (LID) No   Speech 0   Facial Expression 0   Rigidity Neck 0   Rigidity RUE 0   Rigidity LUE 0   Rigidity RLE 0   Rigidity LLE 0   Finger Taps R 0   Finger Taps L 0   Hand Mvt R 0   Hand Mvt L 0   Pron-/Supinate R 0   Pron-/Supinate L 0   Toe Tap R 0   Toe Tap L 0   Leg Agility R 0   Leg Agility L 0   Arise From Chair 0   Gait 0   Gait Freezing 0   Postural Stability 0   Posture 0   Global Spont Mvt 0   Postural Tremor RUE 1   Postural Tremor LUE 1   Kinetic Tremor RUE 1   Kinetic Tremor LUE 1   Rest Tremor RUE 0   Rest Tremor LUE 0   Rest Tremor RLE 0   Rest Tremor LLE 1    Rest Tremor Lip/Jaw 0   Rest Tremor Constancy 3   Total Right 2   Total Left 3   Axial Total 0   Total 8         3/28/2025     2:00 PM   Tremor Motor Scale   Assessment Time 14:30   Medication None   DBS - Right Brain None   DBS - Left Brain None   Head 0   Face & Jaw 0   Voice 0   Outstretched - RIGHT 1  Metacarpophalangeal joint hyperextended suggesting cocontracton of forearm flexors/extensors. Able, on request, to relax the fingers into a more natural semiflexed posture, with reduction in tremor amplitude. --SC   Outstretched - LEFT 1  Metacarpophalangeal joint hyperextended suggesting cocontracton of forearm flexors/extensors. Able, on request, to relax the fingers into a more natural semiflexed posture, with reduction in tremor amplitude. --SC   Wingbeating - RIGHT 1   Wingbeating - LEFT 1   Kinetic - RIGHT 1   Kinetic - LEFT 1   Lower Limb - RIGHT 0   Lower Limb - LEFT 0   Lower Limb (Max) 0   Spiral - RIGHT 1.5  evidence of cocontraction in that the forearm flexors  and extensors were palpably tight. --SC   Spiral - LEFT 1   Handwriting 0   Dot approx - RIGHT 1.5   Dot approx - LEFT 1.5   Trunk (Standing) 0   Total Right 6   Total Left 5.5   Axial 0   TOTAL 11.5     Data Reviewed: I have personally reviewed the tests/studies below.   EMG (9/30/2021)  IMPRESSION:  Normal study. There is no electodiagnostic evidence of focal neuropathy, plexopathy, or radiculopathy in the right upper extremity    Labs (2/7/2025)  - CBC: WBC 6.8, Hgb 12.2, Hc 38.6, Plt 258, MCV 80  - CMP: Na 138, K 4.2, CO2 21, Cl 104, BUN 11.8, Cr 0.75, Glucose 81, Ca 9.0, Alk Phos 96, AST 25, ALT 18, Tbili 0.7  - Vit B12: 342 (borderline low)  - Vit D: 19 (borderline low)  - Iron: 172 (elevated)  - TIBC: 390  - Iron % Sat: 44  - Ferritin: 28  - TSH: 1.64  - Free T4: 1.15    Impression:    Penny Vincent is a 44 year old R-handed female with pertinent PMHx of L retinal hole, HLD, PCOS, Vit D deficiency, iron deficiency anemia, Vit B12 deficiency, ADHD, anxiety, and depression that presents to Neurology Movement clinic with complaints of tremor. Based on her history and exam today, the patient's presentation is most suggestive of Essential Tremor which could have been exacerbated by her prior pregnancy/pre-eclampsia, ongoing anxiety, and possibly from her mood medications (elvia Wellbutrin and Adderall). If she could further reduce these medications, that would be encouraged as long as it doesn't adversely effect her mood. She also reports having seen benefit with even minor doses of Propranolol (10mg BID) and so could return to this and titrate up if she wishes in the future    Plan:   - As long as it doesn't adversely effect her mood, consider reducing Wellbutrin +/- Adderall or finding alternatives as these could be aggravating tremor  - You may return to Propranolol 10mg twice daily or else take this As Needed. This medication helps with the symptoms of tremor rather than acting on the underlying disease so use this  to help you live your life  - If you want to see if you can get further tremor control, we could consider increasing this. You should be able to do this through your PCP or Psychiatrist but if they don't feel comfortable managing this, let us know   - Potential side-effects associated with Propranolol are most often lowered heart rate, dizziness/lightheadedness upon standing, and exercise fatigue though many people tolerate this medication without issue  - Tremor can be exacerbated by any kind of either physical (ex illness, surgery, trauma, poor sleep, pain, etc) or psychologic stress   - Follow-up as needed    Patient was discussed with the Movement Disorder attending, Dr Green, who agrees with the above plan    Naty Venegas MD  Movement Disorder Fellow    Patient seen and examined with Dr. Venegas and I agree with the assessment and plan as outlined.  Time this date with patient, reviewing records, & documentinh.    Lebron Green PhD, MD

## 2025-03-28 ENCOUNTER — OFFICE VISIT (OUTPATIENT)
Dept: NEUROLOGY | Facility: CLINIC | Age: 45
End: 2025-03-28
Payer: COMMERCIAL

## 2025-03-28 ENCOUNTER — PRE VISIT (OUTPATIENT)
Dept: NEUROLOGY | Facility: CLINIC | Age: 45
End: 2025-03-28

## 2025-03-28 VITALS
SYSTOLIC BLOOD PRESSURE: 136 MMHG | HEART RATE: 115 BPM | DIASTOLIC BLOOD PRESSURE: 96 MMHG | OXYGEN SATURATION: 95 % | RESPIRATION RATE: 16 BRPM

## 2025-03-28 DIAGNOSIS — R25.1 TREMOR: ICD-10-CM

## 2025-03-28 DIAGNOSIS — G25.0 ESSENTIAL TREMOR: Primary | ICD-10-CM

## 2025-03-28 PROCEDURE — 1126F AMNT PAIN NOTED NONE PRSNT: CPT | Performed by: PSYCHIATRY & NEUROLOGY

## 2025-03-28 PROCEDURE — 3075F SYST BP GE 130 - 139MM HG: CPT | Performed by: PSYCHIATRY & NEUROLOGY

## 2025-03-28 PROCEDURE — 3080F DIAST BP >= 90 MM HG: CPT | Performed by: PSYCHIATRY & NEUROLOGY

## 2025-03-28 PROCEDURE — 99205 OFFICE O/P NEW HI 60 MIN: CPT | Mod: GC | Performed by: PSYCHIATRY & NEUROLOGY

## 2025-03-28 RX ORDER — PROPRANOLOL HYDROCHLORIDE 10 MG/1
10 TABLET ORAL 2 TIMES DAILY
COMMUNITY
Start: 2025-02-13

## 2025-03-28 RX ORDER — PROPRANOLOL HYDROCHLORIDE 10 MG/1
10 TABLET ORAL 2 TIMES DAILY
COMMUNITY

## 2025-03-28 ASSESSMENT — UNIFIED PARKINSONS DISEASE RATING SCALE (UPDRS)
SPEECH: (0) NORMAL: NO SPEECH PROBLEMS.
AMPLITUDE_LLE: (1) SLIGHT: < 1 CM IN MAXIMAL AMPLITUDE.
TOTAL_SCORE: 8
PRONATION_SUPINATION_RIGHT: (0) NORMAL: NO PROBLEMS.
PARKINSONS_MEDS: OFF
TOETAPPING_LEFT: (0) NORMAL: NO PROBLEMS.
POSTURAL_STABILITY: (0) NORMAL: NO PROBLEMS. RECOVERS WITH ONE OR TWO STEPS.
TOETAPPING_RIGHT: (0) NORMAL: NO PROBLEMS.
AMPLITUDE_LIP_JAW: (0) NORMAL: NO TREMOR.
AMPLITUDE_LUE: (0) NORMAL: NO TREMOR.
AMPLITUDE_RLE: (0) NORMAL: NO TREMOR.
TOTAL_SCORE_LEFT: 3
ARISING_CHAIR: (0) NORMAL: NO PROBLEMS. ABLE TO ARISE QUICKLY WITHOUT HESITATION.
RIGIDITY_RUE: (0) NORMAL: NO RIGIDITY.
SPONTANEITY_OF_MOVEMENT: (0) NORMAL: NO PROBLEMS.
LEG_AGILITY_LEFT: (0) NORMAL: NO PROBLEMS.
CONSTANCY_TREMOR_ATREST: (3) MODERATE: TREMOR AT REST IS PRESENT 51-75% OF THE ENTIRE EXAMINATION PERIOD.
DYSKINESIAS_PRESENT: NO
RIGIDITY_RLE: (0) NORMAL: NO RIGIDITY.
HANDMOVEMENTS_LEFT: (0) NORMAL: NO PROBLEMS.
POSTURE: (0) NORMAL: NO PROBLEMS.
TOTAL_SCORE: 2
PRONATION_SUPINATION_LEFT: (0) NORMAL: NO PROBLEMS.
FREEZING_GAIT: (0) NORMAL: NO FREEZING.
RIGIDITY_LUE: (0) NORMAL: NO RIGIDITY.
HANDMOVEMENTS_RIGHT: (0) NORMAL: NO PROBLEMS.
RIGIDITY_NECK: (0) NORMAL: NO RIGIDITY.
LEG_AGILITY_RIGHT: (0) NORMAL: NO PROBLEMS.
GAIT: (0) NORMAL: NO PROBLEMS.
FACIAL_EXPRESSION: (0) NORMAL: NORMAL FACIAL EXPRESSION.
AXIAL_SCORE: 0
RIGIDITY_LLE: (0) NORMAL: NO RIGIDITY.
FINGER_TAPPING_RIGHT: (0) NORMAL: NO PROBLEMS.
FINGER_TAPPING_LEFT: (0) NORMAL: NO PROBLEMS.
AMPLITUDE_RUE: (0) NORMAL: NO TREMOR.

## 2025-03-28 ASSESSMENT — MOVEMENT DISORDERS SOCIETY - UNIFIED PARKINSONS DISEASE RATING SCALE (MDS-UPDRS)
CHEWING_AND_SWALLOWING: (0) NORMAL: NO PROBLEMS.
TOTAL_SCORE: 2
TREMOR: (2) MILD: SHAKING OR TREMOR CAUSES PROBLEMS WITH ONLY A FEW ACTIVITIES.
FREEZING: (0) NORMAL: NOT AT ALL (NO PROBLEMS).
HYGIENE: (0) NORMAL: NOT AT ALL (NO PROBLEMS).
HANDWRITING: (0) NORMAL: NOT AT ALL (NO PROBLEMS).
GETTING_OUT_OF_BED_CAR_DEEP_CHAIR: (0) NORMAL: NOT AT ALL (NO PROBLEMS).
SALIVA_AND_DROOLING: (0) NORMAL: NOT AT ALL (NO PROBLEMS).
HOBBIES_AND_OTHER_ACTIVITIES: (0) NORMAL: NOT AT ALL (NO PROBLEMS).
TURNING_IN_BED: (0) NORMAL: NOT AT ALL (NO PROBLEMS).
WALKING_AND_BALANCE: (0) NORMAL: NOT AT ALL (NO PROBLEMS).
EATING_TASKS: (0) NORMAL: NOT AT ALL (NO PROBLEMS).
SPEECH: (0) NORMAL: NOT AT ALL (NO PROBLEMS).
DRESSING: (0) NORMAL: NOT AT ALL (NO PROBLEMS).

## 2025-03-28 ASSESSMENT — PAIN SCALES - GENERAL: PAINLEVEL_OUTOF10: NO PAIN (0)

## 2025-03-28 NOTE — NURSING NOTE
Chief Complaint   Patient presents with    New Patient     New Movement Disorder       BP (!) 136/96 (BP Location: Right arm, Patient Position: Sitting, Cuff Size: Adult Regular)   Pulse 115   Resp 16   LMP 01/25/2025 (Within Days)   SpO2 95%       Briana Tovar

## 2025-03-28 NOTE — PATIENT INSTRUCTIONS
- As long as it doesn't adversely effect her mood, consider reducing Wellbutrin +/- Adderall or finding alternatives as these could be aggravating tremor  - You may return to Propranolol 10mg twice daily or else take this As Needed. This medication helps with the symptoms of tremor rather than acting on the underlying disease so use this to help you live your life  - If you want to see if you can get further tremor control, we could consider increasing this. You should be able to do this through your PCP or Psychiatrist but if they don't feel comfortable managing this, let us know   - Potential side-effects associated with Propranolol are most often lowered heart rate, dizziness/lightheadedness upon standing, and exercise fatigue though many people tolerate this medication without issue  - Tremor can be exacerbated by any kind of either physical (ex illness, surgery, trauma, poor sleep, pain, etc) or psychologic stress   - Follow-up as needed should symptoms worsen or new ones arise